# Patient Record
Sex: FEMALE | Race: WHITE
[De-identification: names, ages, dates, MRNs, and addresses within clinical notes are randomized per-mention and may not be internally consistent; named-entity substitution may affect disease eponyms.]

---

## 2018-05-22 ENCOUNTER — HOSPITAL ENCOUNTER (OUTPATIENT)
Dept: HOSPITAL 47 - LABWHC1 | Age: 68
Discharge: HOME | End: 2018-05-22
Attending: NURSE PRACTITIONER
Payer: COMMERCIAL

## 2018-05-22 DIAGNOSIS — Z00.00: Primary | ICD-10-CM

## 2018-05-22 DIAGNOSIS — E78.5: ICD-10-CM

## 2018-05-22 LAB
ALBUMIN SERPL-MCNC: 4.3 G/DL (ref 3.5–5)
ALP SERPL-CCNC: 92 U/L (ref 38–126)
ALT SERPL-CCNC: 33 U/L (ref 9–52)
ANION GAP SERPL CALC-SCNC: 13 MMOL/L
AST SERPL-CCNC: 26 U/L (ref 14–36)
BASOPHILS # BLD AUTO: 0.1 K/UL (ref 0–0.2)
BASOPHILS NFR BLD AUTO: 1 %
BUN SERPL-SCNC: 14 MG/DL (ref 7–17)
CALCIUM SPEC-MCNC: 10 MG/DL (ref 8.4–10.2)
CHLORIDE SERPL-SCNC: 104 MMOL/L (ref 98–107)
CHOLEST SERPL-MCNC: 232 MG/DL (ref ?–200)
CO2 SERPL-SCNC: 26 MMOL/L (ref 22–30)
EOSINOPHIL # BLD AUTO: 0.3 K/UL (ref 0–0.7)
EOSINOPHIL NFR BLD AUTO: 3 %
ERYTHROCYTE [DISTWIDTH] IN BLOOD BY AUTOMATED COUNT: 4.95 M/UL (ref 3.8–5.4)
ERYTHROCYTE [DISTWIDTH] IN BLOOD: 13.9 % (ref 11.5–15.5)
GLUCOSE SERPL-MCNC: 124 MG/DL (ref 74–99)
HCT VFR BLD AUTO: 46 % (ref 34–46)
HDLC SERPL-MCNC: 48 MG/DL (ref 40–60)
HGB BLD-MCNC: 15.2 GM/DL (ref 11.4–16)
LDLC SERPL CALC-MCNC: 157 MG/DL (ref 0–99)
LYMPHOCYTES # SPEC AUTO: 3.2 K/UL (ref 1–4.8)
LYMPHOCYTES NFR SPEC AUTO: 31 %
MCH RBC QN AUTO: 30.8 PG (ref 25–35)
MCHC RBC AUTO-ENTMCNC: 33.1 G/DL (ref 31–37)
MCV RBC AUTO: 92.9 FL (ref 80–100)
MONOCYTES # BLD AUTO: 0.5 K/UL (ref 0–1)
MONOCYTES NFR BLD AUTO: 5 %
NEUTROPHILS # BLD AUTO: 5.9 K/UL (ref 1.3–7.7)
NEUTROPHILS NFR BLD AUTO: 59 %
PLATELET # BLD AUTO: 327 K/UL (ref 150–450)
POTASSIUM SERPL-SCNC: 4.6 MMOL/L (ref 3.5–5.1)
PROT SERPL-MCNC: 7.2 G/DL (ref 6.3–8.2)
SODIUM SERPL-SCNC: 143 MMOL/L (ref 137–145)
TRIGL SERPL-MCNC: 135 MG/DL (ref ?–150)
WBC # BLD AUTO: 10.1 K/UL (ref 3.8–10.6)

## 2018-05-22 PROCEDURE — 80053 COMPREHEN METABOLIC PANEL: CPT

## 2018-05-22 PROCEDURE — 80061 LIPID PANEL: CPT

## 2018-05-22 PROCEDURE — 36415 COLL VENOUS BLD VENIPUNCTURE: CPT

## 2018-05-22 PROCEDURE — 85025 COMPLETE CBC W/AUTO DIFF WBC: CPT

## 2018-05-22 PROCEDURE — 84443 ASSAY THYROID STIM HORMONE: CPT

## 2018-12-06 ENCOUNTER — HOSPITAL ENCOUNTER (OUTPATIENT)
Dept: HOSPITAL 47 - LABWHC1 | Age: 68
Discharge: HOME | End: 2018-12-06
Attending: FAMILY MEDICINE
Payer: COMMERCIAL

## 2018-12-06 DIAGNOSIS — R73.9: ICD-10-CM

## 2018-12-06 DIAGNOSIS — M19.90: Primary | ICD-10-CM

## 2018-12-06 LAB — HBA1C MFR BLD: 6.3 % (ref 4–6)

## 2018-12-06 PROCEDURE — 36415 COLL VENOUS BLD VENIPUNCTURE: CPT

## 2018-12-06 PROCEDURE — 86140 C-REACTIVE PROTEIN: CPT

## 2018-12-06 PROCEDURE — 83036 HEMOGLOBIN GLYCOSYLATED A1C: CPT

## 2023-04-04 ENCOUNTER — HOSPITAL ENCOUNTER (OUTPATIENT)
Dept: HOSPITAL 47 - ORWHC2ENDO | Age: 73
Discharge: HOME | End: 2023-04-04
Attending: SURGERY
Payer: MEDICARE

## 2023-04-04 VITALS — HEART RATE: 67 BPM | DIASTOLIC BLOOD PRESSURE: 59 MMHG | SYSTOLIC BLOOD PRESSURE: 113 MMHG

## 2023-04-04 VITALS — TEMPERATURE: 97 F

## 2023-04-04 VITALS — RESPIRATION RATE: 18 BRPM

## 2023-04-04 DIAGNOSIS — F41.9: ICD-10-CM

## 2023-04-04 DIAGNOSIS — Z12.11: Primary | ICD-10-CM

## 2023-04-04 DIAGNOSIS — Z79.899: ICD-10-CM

## 2023-04-04 DIAGNOSIS — E78.5: ICD-10-CM

## 2023-04-04 DIAGNOSIS — K21.9: ICD-10-CM

## 2023-04-04 DIAGNOSIS — Z87.891: ICD-10-CM

## 2023-04-04 DIAGNOSIS — M19.90: ICD-10-CM

## 2023-04-04 DIAGNOSIS — Z80.1: ICD-10-CM

## 2023-04-04 DIAGNOSIS — J44.9: ICD-10-CM

## 2023-04-04 DIAGNOSIS — Z79.51: ICD-10-CM

## 2023-04-04 DIAGNOSIS — Z80.8: ICD-10-CM

## 2023-04-04 NOTE — P.PCN
Date of Procedure: 04/04/23


Procedure(s) Performed: 


PREOPERATIVE DIAGNOSIS: Colon cancer screening


POSTOPERATIVE DIAGNOSIS: Diverticulosis


PROCEDURE: Colonoscopy 


ANESTHESIA: MAC


SURGEON: Blu Novoa M.D.


SPECIMENS: None


ENDOSCOPIC PROCEDURE:  The patient was placed on the endoscopy table in the left

decubitus position.  The Olympus colonoscope was inserted into the anus and 

passed under direct visualization to the base of the cecum.  The appendiceal 

orifice was visualized.  From that point the scope was slowly withdrawn inspe

cting all surfaces carefully.  There were no neoplastic inflammatory or polypoid

lesions throughout the cecum, ascending, transverse, descending, sigmoid and 

rectum.  There was no visible diverticulosis noted.  Digital rectal examination 

was normal.  The patient was taken to the recovery room in stable condition per 

anesthesia guidelines.


RECOMMENDATIONS: Resume diet.  Repeat colonoscopy in 5-10 years.  Patient states

she has colonoscopy every 5 years but she denies family history or history of 

polyps.

## 2023-04-04 NOTE — P.GSHP
History of Present Illness


H&P Date: 04/04/23


Chief Complaint: Colon cancer screening





72-year-old female here today for colonoscopy.  Last colonoscopy 5-10 years ago.

 No bowel complaints.  No family history of colon cancer.





Past Medical History


Past Medical History: COPD, GERD/Reflux, Osteoarthritis (OA)


Additional Past Medical History / Comment(s): polyps.


History of Any Multi-Drug Resistant Organisms: None Reported


Past Surgical History: Hysterectomy, Tonsillectomy


Additional Past Surgical History / Comment(s): colonoscopy.


Past Anesthesia/Blood Transfusion Reactions: No Reported Reaction


Smoking Status: Former smoker





- Past Family History


  ** Mother


Family Medical History: Cancer


Additional Family Medical History / Comment(s): bone cancer





  ** Father


Family Medical History: Cancer


Additional Family Medical History / Comment(s): lung cancer





Medications and Allergies


                                Home Medications











 Medication  Instructions  Recorded  Confirmed  Type


 


Cyanocobalamin (Vitamin B-12) 5,000 mcg PO DAILY 03/30/23 03/30/23 History





[Vitamin B-12]    


 


DULoxetine HCL [Cymbalta] 20 mg PO HS 03/30/23 03/30/23 History


 


Ergocalciferol [Vitamin D2 (1250 1,250 mcg PO FR 03/30/23 03/30/23 History





Mcg = 95262 Iu)]    


 


Montelukast [Singulair] 10 mg PO HS 03/30/23 03/30/23 History


 


Omeprazole 20 mg PO HS 03/30/23 04/04/23 History


 


Rosuvastatin [Crestor] 20 mg PO HS 03/30/23 04/04/23 History








                                    Allergies











Allergy/AdvReac Type Severity Reaction Status Date / Time


 


No Known Allergies Allergy   Verified 04/04/23 08:32














Surgical - Exam


                                   Vital Signs











Temp Pulse Resp BP Pulse Ox


 


 97 F L  98   17   154/79   94 L


 


 04/04/23 08:45  04/04/23 08:45  04/04/23 08:45  04/04/23 08:45  04/04/23 08:45

















Physical exam:


General: Well-developed, well-nourished


HEENT: Normocephalic, sclerae nonicteric


Abdomen: Nontender, nondistended


Extremities: No edema


Neuro: Alert and oriented





Assessment and Plan


(1) Colon cancer screening


Narrative/Plan: 


Will proceed with colonoscopy at this time.





Current Visit: Yes   Status: Acute   Code(s): Z12.11 - ENCOUNTER FOR SCREENING 

FOR MALIGNANT NEOPLASM OF COLON   SNOMED Code(s): 047356066

## 2025-01-08 ENCOUNTER — HOSPITAL ENCOUNTER (OUTPATIENT)
Dept: HOSPITAL 47 - RADXRMAIN | Age: 75
Discharge: HOME | End: 2025-01-08
Attending: INTERNAL MEDICINE
Payer: MEDICARE

## 2025-01-08 DIAGNOSIS — J06.9: Primary | ICD-10-CM

## 2025-01-08 PROCEDURE — 71046 X-RAY EXAM CHEST 2 VIEWS: CPT

## 2025-01-08 NOTE — XR
Chest, 2 view.

 

HISTORY: Cough.

 

COMPARISON: none

 

TECHNIQUE: PA and lateral views the chest are obtained.

 

FINDINGS:

 

The lungs are clear and there is no consolidative or interstitial opacity.

 

There is no pleural effusion or pneumothorax.

 

The heart, pulmonary vasculature, mediastinum and jonnathan appear normal.

 

The osseous structures are intact.

 

IMPRESSION:

 

No significant abnormality seen. No acute cardiopulmonary disease.

 

[facility], Workstation: [facility], 1/8/2025 1:18 PM